# Patient Record
Sex: MALE | URBAN - METROPOLITAN AREA
[De-identification: names, ages, dates, MRNs, and addresses within clinical notes are randomized per-mention and may not be internally consistent; named-entity substitution may affect disease eponyms.]

---

## 2024-02-04 ENCOUNTER — OFFICE VISIT (OUTPATIENT)
Dept: URGENT CARE | Facility: CLINIC | Age: 24
End: 2024-02-04
Payer: MEDICAID

## 2024-02-04 VITALS
WEIGHT: 180 LBS | TEMPERATURE: 98 F | SYSTOLIC BLOOD PRESSURE: 124 MMHG | DIASTOLIC BLOOD PRESSURE: 77 MMHG | RESPIRATION RATE: 16 BRPM | HEART RATE: 88 BPM | OXYGEN SATURATION: 98 %

## 2024-02-04 DIAGNOSIS — J06.9 ACUTE URI: Primary | ICD-10-CM

## 2024-02-04 PROBLEM — H10.32 ACUTE BACTERIAL CONJUNCTIVITIS OF LEFT EYE: Status: ACTIVE | Noted: 2024-02-04

## 2024-02-04 LAB
POC RAPID STREP: NEGATIVE
POC TRIPLEX FLU A-AG: NORMAL
POC TRIPLEX FLU B-AG: NORMAL
POC TRIPLEX SARSCOV-2 AG: NORMAL

## 2024-02-04 PROCEDURE — 87428 SARSCOV & INF VIR A&B AG IA: CPT | Performed by: PHYSICIAN ASSISTANT

## 2024-02-04 PROCEDURE — 87880 STREP A ASSAY W/OPTIC: CPT | Performed by: PHYSICIAN ASSISTANT

## 2024-02-04 PROCEDURE — 1036F TOBACCO NON-USER: CPT | Performed by: PHYSICIAN ASSISTANT

## 2024-02-04 PROCEDURE — 99203 OFFICE O/P NEW LOW 30 MIN: CPT | Performed by: PHYSICIAN ASSISTANT

## 2024-02-04 RX ORDER — POLYMYXIN B SULFATE AND TRIMETHOPRIM 1; 10000 MG/ML; [USP'U]/ML
1 SOLUTION OPHTHALMIC EVERY 4 HOURS
Qty: 10 ML | Refills: 0 | Status: SHIPPED | OUTPATIENT
Start: 2024-02-04 | End: 2024-02-04 | Stop reason: ENTERED-IN-ERROR

## 2024-02-04 ASSESSMENT — ENCOUNTER SYMPTOMS
FATIGUE: 1
FEVER: 1
BLOOD IN STOOL: 0
ENDOCRINE NEGATIVE: 1
BACK PAIN: 0
NAUSEA: 0
SHORTNESS OF BREATH: 1
DYSURIA: 0
EYE REDNESS: 0
ARTHRALGIAS: 0
ABDOMINAL PAIN: 0
COLOR CHANGE: 0
ALLERGIC/IMMUNOLOGIC NEGATIVE: 1
EYE DISCHARGE: 0
ACTIVITY CHANGE: 0
SINUS PRESSURE: 0
WHEEZING: 0
APPETITE CHANGE: 0
DIARRHEA: 0
PSYCHIATRIC NEGATIVE: 1
RHINORRHEA: 0
COUGH: 1
HEADACHES: 1
EYE PAIN: 0
SORE THROAT: 1
VOMITING: 0
FLANK PAIN: 0
HEMATOLOGIC/LYMPHATIC NEGATIVE: 1
WOUND: 0

## 2024-02-04 ASSESSMENT — PAIN SCALES - GENERAL: PAINLEVEL: 6

## 2024-02-04 NOTE — PROGRESS NOTES
Subjective   Patient ID: David Vaca is a 23 y.o. male who presents for Cough and Headache (Sob, sneeze 3days ).  Patient endorses subjective fevers, headache, chest congestion sneezing cough sore throat over the course the last 3 days.  He denies any recorded fevers.  He denies any nausea vomiting diarrhea or abdominal pain denies any myalgias.  Patient is concerned about possibility of COVID and is requesting testing      Review of Systems   Constitutional:  Positive for fatigue and fever. Negative for activity change and appetite change.   HENT:  Positive for congestion and sore throat. Negative for rhinorrhea and sinus pressure.    Eyes:  Negative for pain, discharge and redness.   Respiratory:  Positive for cough and shortness of breath. Negative for wheezing.    Cardiovascular:  Negative for chest pain and leg swelling.   Gastrointestinal:  Negative for abdominal pain, blood in stool, diarrhea, nausea and vomiting.   Endocrine: Negative.    Genitourinary:  Negative for dysuria and flank pain.   Musculoskeletal:  Negative for arthralgias, back pain and gait problem.   Skin:  Negative for color change, rash and wound.   Allergic/Immunologic: Negative.    Neurological:  Positive for headaches.   Hematological: Negative.    Psychiatric/Behavioral: Negative.         Objective   /77   Pulse 88   Temp 36.7 °C (98 °F)   Resp 16   Wt 81.6 kg (180 lb)   SpO2 98%   --Patient satting 98% on room air respirations of 16 breaths/min no tachycardia no evidence of respiratory distress despite patient complaints of shortness of breath      Physical Exam  Vitals reviewed.   Constitutional:       General: He is not in acute distress.     Appearance: Normal appearance. He is not toxic-appearing.   HENT:      Head: Normocephalic.      Right Ear: Tympanic membrane and ear canal normal. No tenderness. No mastoid tenderness.      Left Ear: Tympanic membrane and ear canal normal. No tenderness. No mastoid tenderness.       Nose: Congestion and rhinorrhea present.      Mouth/Throat:      Mouth: Mucous membranes are moist.      Pharynx: Oropharynx is clear. Posterior oropharyngeal erythema present.   Eyes:      Conjunctiva/sclera: Conjunctivae normal.      Pupils: Pupils are equal, round, and reactive to light.   Cardiovascular:      Rate and Rhythm: Normal rate and regular rhythm.      Heart sounds: No murmur heard.  Pulmonary:      Effort: No respiratory distress.      Breath sounds: No stridor. No wheezing, rhonchi or rales.   Chest:      Chest wall: No tenderness.   Abdominal:      Tenderness: There is no abdominal tenderness. There is no guarding.   Musculoskeletal:         General: Normal range of motion.   Skin:     General: Skin is warm and dry.      Capillary Refill: Capillary refill takes less than 2 seconds.      Findings: No erythema.   Neurological:      General: No focal deficit present.      Mental Status: He is alert.         Assessment/Plan   Problem List Items Addressed This Visit       Acute URI - Primary    Relevant Orders    POCT rapid strep A manually resulted    POCT BD Veritor Triplex Ag      Patient with symptoms and history consistent with a viral upper respiratory infection   Antibiotics are not necessary and do not help treat viral respiratory tract infections.   Common respiratory tract infections include the common cold. Typical symptoms include nasal congestion, a runny nose, scratchy throat, cough, and irritability.    EVIDENCE BASED MEDICINE FOR UPPER RESPIRATORY INFECTIONS:    The best evidence for the prevention of the common cold supports physical interventions (e.g., handwashing).    The best evidence for traditional treatments supports the use of acetaminophen and nonsteroidal anti-inflammatory drugs (for pain and fever) and possibly antihistamine-decongestant combinations. Ibuprofen appears to be superior to acetaminophen for the treatment of fever in children.    The best evidence for  nontraditional treatments of the common cold supports the use of honey at bedtime for cough in children over one year.

## 2024-02-04 NOTE — PATIENT INSTRUCTIONS
Assessment/Plan   Problem List Items Addressed This Visit       Acute URI - Primary    Relevant Orders    POCT rapid strep A manually resulted    POCT BD Veritor Triplex Ag      Patient with symptoms and history consistent with a viral upper respiratory infection   Antibiotics are not necessary and do not help treat viral respiratory tract infections.   Common respiratory tract infections include the common cold. Typical symptoms include nasal congestion, a runny nose, scratchy throat, cough, and irritability.    EVIDENCE BASED MEDICINE FOR UPPER RESPIRATORY INFECTIONS:    The best evidence for the prevention of the common cold supports physical interventions (e.g., handwashing).    The best evidence for traditional treatments supports the use of acetaminophen and nonsteroidal anti-inflammatory drugs (for pain and fever) and possibly antihistamine-decongestant combinations. Ibuprofen appears to be superior to acetaminophen for the treatment of fever in children.    The best evidence for nontraditional treatments of the common cold supports the use of honey at bedtime for cough in children over one year.